# Patient Record
Sex: MALE
[De-identification: names, ages, dates, MRNs, and addresses within clinical notes are randomized per-mention and may not be internally consistent; named-entity substitution may affect disease eponyms.]

---

## 2023-10-07 ENCOUNTER — NURSE TRIAGE (OUTPATIENT)
Dept: OTHER | Facility: CLINIC | Age: 88
End: 2023-10-07

## 2023-10-07 NOTE — TELEPHONE ENCOUNTER
Location of patient: [address] [facility] call from [facility] with [facility]. Daughter, Blas Alfaro calling. Patient is not there, limited triage. Nabor Sepulveda MRN: [id number]    Subjective: Caller states \"got out of hospital with pneumonia, augmentin making him sick, diarrhea. Stopped the Augmentin\"     Current Symptoms: Diarrhea, that she says is severe and patient is refusing to take the Augmentin anymore. This started Friday, she thinks. She is unsure of when he came home. Patient has issues with eating and drinking/swallowing, is at end of life per daughter, he refused hospice. 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 Associated Symptoms: reduced appetite, reduced fluid intake, diarrhea    Pain Severity: unable to assess, daughter says he does not c/o pain    Temperature: denies fever     What has been tried: imodium    Recommended disposition: Call  Now    Care advice provided, patient verbalizes understanding; denies any other questions or concerns. Outcome: Patient/caller agrees to calling 911. Courtesy call placed to, Marguerite Haney, on-call provider. She did not answer, so VM was left to let her know.       This triage is a result of a call to the [address]        Reason for Disposition   Difficult to awaken or acting confused (e.g., disoriented, slurred speech)    Protocols used: Cancer - Diarrhea-ADULT-